# Patient Record
Sex: MALE | Race: WHITE
[De-identification: names, ages, dates, MRNs, and addresses within clinical notes are randomized per-mention and may not be internally consistent; named-entity substitution may affect disease eponyms.]

---

## 2017-10-12 ENCOUNTER — HOSPITAL ENCOUNTER (OUTPATIENT)
Dept: HOSPITAL 62 - SC | Age: 72
Discharge: HOME | End: 2017-10-12
Attending: OPHTHALMOLOGY
Payer: MEDICARE

## 2017-10-12 DIAGNOSIS — N18.3: ICD-10-CM

## 2017-10-12 DIAGNOSIS — Z85.51: ICD-10-CM

## 2017-10-12 DIAGNOSIS — N40.0: ICD-10-CM

## 2017-10-12 DIAGNOSIS — I12.9: ICD-10-CM

## 2017-10-12 DIAGNOSIS — H25.13: Primary | ICD-10-CM

## 2017-10-12 DIAGNOSIS — Z79.899: ICD-10-CM

## 2017-10-12 DIAGNOSIS — Z87.891: ICD-10-CM

## 2017-10-12 PROCEDURE — 08RJ3JZ REPLACEMENT OF RIGHT LENS WITH SYNTHETIC SUBSTITUTE, PERCUTANEOUS APPROACH: ICD-10-PCS | Performed by: OPHTHALMOLOGY

## 2017-10-12 PROCEDURE — 66984 XCAPSL CTRC RMVL W/O ECP: CPT

## 2017-10-12 PROCEDURE — V2632 POST CHMBR INTRAOCULAR LENS: HCPCS

## 2017-10-12 RX ADMIN — TETRACAINE HYDROCHLORIDE PRN DROP: 5 SOLUTION OPHTHALMIC at 08:16

## 2017-10-12 RX ADMIN — BESIFLOXACIN PRN DROP: 6 SUSPENSION OPHTHALMIC at 09:04

## 2017-10-12 RX ADMIN — TETRACAINE HYDROCHLORIDE PRN DROP: 5 SOLUTION OPHTHALMIC at 08:43

## 2017-10-12 RX ADMIN — TROPICAMIDE PRN DROP: 10 SOLUTION/ DROPS OPHTHALMIC at 08:35

## 2017-10-12 RX ADMIN — BESIFLOXACIN PRN DROP: 6 SUSPENSION OPHTHALMIC at 00:00

## 2017-10-12 RX ADMIN — CYCLOPENTOLATE HYDROCHLORIDE AND PHENYLEPHRINE HYDROCHLORIDE PRN DROP: 2; 10 SOLUTION/ DROPS OPHTHALMIC at 08:35

## 2017-10-12 RX ADMIN — TROPICAMIDE PRN DROP: 10 SOLUTION/ DROPS OPHTHALMIC at 08:27

## 2017-10-12 RX ADMIN — TROPICAMIDE PRN DROP: 10 SOLUTION/ DROPS OPHTHALMIC at 08:16

## 2017-10-12 RX ADMIN — BESIFLOXACIN PRN DROP: 6 SUSPENSION OPHTHALMIC at 08:27

## 2017-10-12 RX ADMIN — CYCLOPENTOLATE HYDROCHLORIDE AND PHENYLEPHRINE HYDROCHLORIDE PRN DROP: 2; 10 SOLUTION/ DROPS OPHTHALMIC at 08:16

## 2017-10-12 RX ADMIN — CYCLOPENTOLATE HYDROCHLORIDE AND PHENYLEPHRINE HYDROCHLORIDE PRN DROP: 2; 10 SOLUTION/ DROPS OPHTHALMIC at 08:27

## 2017-10-12 RX ADMIN — BESIFLOXACIN PRN DROP: 6 SUSPENSION OPHTHALMIC at 08:17

## 2017-10-12 RX ADMIN — TETRACAINE HYDROCHLORIDE PRN DROP: 5 SOLUTION OPHTHALMIC at 08:40

## 2017-10-13 NOTE — [facility] DISCHARGE SUMMARY E
[facility] Discharge Summary



NAME: ZANE SOLIS

                                      MRN: [id number]

                                AGE: 72Y

ADMITTED: 10/12/2017           DISCHARGED: 10/12/2017



HISTORY:

This is a 72-year-old patient who underwent cataract extraction of the

right eye.



DIAGNOSIS:

Cataract of the right eye.



HOSPITAL COURSE:

The patient underwent surgery because she was having difficulty driving at

night secondary to glare of headlights.  He should be on a regular diet. 

No bending at his waist.  No heavy lifting.  He should use his Besivance,

Ilevro, and Durezol at 3 p.m. and 8 p.m. and sleep with a rigid shield, and

I will see him for one day postoperative tomorrow.





DICTATING PHYSICIAN: CASSY ASHLEY M.D.



[id number]              DT: 10/13/2017 0816

PHY#: [id number]         DD: 10/13/2017 0743

ID:   [id number]               JOB#: [id number]       ACCT: [id number]



cc:CASSY ASHLEY M.D.

>

## 2017-10-13 NOTE — [facility] OPERATIVE REPORT E
[facility] Operative Report



NAME: ZANE SOLIS

                                      MRN: [id number]

                             AGE: 72Y

DATE OF SURGERY: 10/12/2017                   ROOM:



PREOPERATIVE DIAGNOSIS:

CATARACT, RIGHT EYE.



POSTOPERATIVE DIAGNOSIS:

CATARACT, RIGHT EYE.



OPERATION:

Cataract extraction with intraocular lens implant of the right eye.



SURGEON:

CASSY ASHLEY M.D.



ANESTHESIA:

Topical.



PROCEDURE:

After obtaining appropriate consent, the patient's right eye was prepped

and draped in sterile fashion as well as the surgeon in a sterile manner

and cataract surgery was started.  First a paracentesis blade was used to

make a small side-port incision.  Viscoelastic was used to inflate the

anterior chamber.  Next a 2.4 mm incision was made with the paracentesis

blade.  A continuous capsulorrhexis incision was made using a cystotome and

Utrata forceps.  Following this hydrodissection was carried out to make the

lens fully loose and mobile and it was rotated 90 degrees.  Following this,

a divide-and-conquer technique was used to phacoemulsify the lens with a

CDE of 11.93. The remaining cortex was removed with irrigation/aspiration. 

Provisc was instilled into the capsular bag to inflate the bag.  A SN60WF,

18.5 diopter lens was placed.  The remaining viscoelastic material was

removed with irrigation/aspiration.  Following this, a 10-0 nylon suture

was used to close the incision and it was found to be watertight.  Vigamox

was instilled in the eye and a protective shield was placed over the eye. 

The patient returned to the postoperative recovery in stable condition.





DICTATING PHYSICIAN: CASSY ASHLEY M.D.



[id number]              DT: 10/13/2017 0813

PHY#: [id number]         DD: 10/13/2017 0743

ID:   [id number]               JOB#: [id number]       ACCT: [id number]



cc:CASSY ASHLEY M.D.

>

## 2017-10-26 ENCOUNTER — HOSPITAL ENCOUNTER (OUTPATIENT)
Dept: HOSPITAL 62 - SC | Age: 72
Discharge: HOME | End: 2017-10-26
Attending: OPHTHALMOLOGY
Payer: MEDICARE

## 2017-10-26 DIAGNOSIS — Z87.891: ICD-10-CM

## 2017-10-26 DIAGNOSIS — I12.9: ICD-10-CM

## 2017-10-26 DIAGNOSIS — N18.3: ICD-10-CM

## 2017-10-26 DIAGNOSIS — Z96.1: ICD-10-CM

## 2017-10-26 DIAGNOSIS — Z85.51: ICD-10-CM

## 2017-10-26 DIAGNOSIS — Z79.899: ICD-10-CM

## 2017-10-26 DIAGNOSIS — K21.9: ICD-10-CM

## 2017-10-26 DIAGNOSIS — H25.12: Primary | ICD-10-CM

## 2017-10-26 PROCEDURE — V2632 POST CHMBR INTRAOCULAR LENS: HCPCS

## 2017-10-26 PROCEDURE — 08RK3JZ REPLACEMENT OF LEFT LENS WITH SYNTHETIC SUBSTITUTE, PERCUTANEOUS APPROACH: ICD-10-PCS | Performed by: OPHTHALMOLOGY

## 2017-10-26 PROCEDURE — 66984 XCAPSL CTRC RMVL W/O ECP: CPT

## 2017-10-26 RX ADMIN — BESIFLOXACIN PRN DROP: 6 SUSPENSION OPHTHALMIC at 00:00

## 2017-10-26 RX ADMIN — BESIFLOXACIN PRN DROP: 6 SUSPENSION OPHTHALMIC at 08:23

## 2017-10-26 RX ADMIN — CYCLOPENTOLATE HYDROCHLORIDE AND PHENYLEPHRINE HYDROCHLORIDE PRN DROP: 2; 10 SOLUTION/ DROPS OPHTHALMIC at 07:13

## 2017-10-26 RX ADMIN — TETRACAINE HYDROCHLORIDE PRN DROP: 5 SOLUTION OPHTHALMIC at 07:56

## 2017-10-26 RX ADMIN — BESIFLOXACIN PRN DROP: 6 SUSPENSION OPHTHALMIC at 07:21

## 2017-10-26 RX ADMIN — TROPICAMIDE PRN DROP: 10 SOLUTION/ DROPS OPHTHALMIC at 07:21

## 2017-10-26 RX ADMIN — CYCLOPENTOLATE HYDROCHLORIDE AND PHENYLEPHRINE HYDROCHLORIDE PRN DROP: 2; 10 SOLUTION/ DROPS OPHTHALMIC at 07:21

## 2017-10-26 RX ADMIN — TROPICAMIDE PRN DROP: 10 SOLUTION/ DROPS OPHTHALMIC at 07:13

## 2017-10-26 RX ADMIN — CYCLOPENTOLATE HYDROCHLORIDE AND PHENYLEPHRINE HYDROCHLORIDE PRN DROP: 2; 10 SOLUTION/ DROPS OPHTHALMIC at 07:33

## 2017-10-26 RX ADMIN — TROPICAMIDE PRN DROP: 10 SOLUTION/ DROPS OPHTHALMIC at 07:33

## 2017-10-26 RX ADMIN — TETRACAINE HYDROCHLORIDE PRN DROP: 5 SOLUTION OPHTHALMIC at 07:13

## 2017-10-26 RX ADMIN — BESIFLOXACIN PRN DROP: 6 SUSPENSION OPHTHALMIC at 07:13

## 2017-10-26 RX ADMIN — TETRACAINE HYDROCHLORIDE PRN DROP: 5 SOLUTION OPHTHALMIC at 07:59

## 2017-10-27 NOTE — [facility] DISCHARGE SUMMARY E
[facility] Discharge Summary



NAME: ZANE SOLIS

                                      MRN: [id number]

                                AGE: 72Y

ADMITTED: 10/26/2017           DISCHARGED: 10/26/2017





HISTORY OF PRESENT ILLNESS AND HOSPITAL COURSE:

This is a 72-year-old male who underwent cataract extraction of the left

eye.



DIAGNOSIS:

Cataract, left eye.



HOSPITAL COURSE:

He underwent surgery because he was having difficulty with driving because

of glare from headlights.



DISCHARGE INSTRUCTIONS:

1.  He should be on a regular diet.

2.  No bending at his waist and no heavy lifting.

3.  He should use his Besivance, Ilevro, and Durezol at 3 p.m. and 8 p.m.

and sleep with a rigid shield.

4.  I will see him for his one-day postoperative tomorrow.







DICTATING PHYSICIAN: CASSY ASHLEY M.D.



[age]M              DT: 10/27/2017 0827

PHY#: 2011         DD: 10/27/2017 0740

ID:   [id number]               JOB#: [id number]       ACCT: [id number]



cc:CASSY ASHLEY M.D.

>

## 2017-10-27 NOTE — [facility] OPERATIVE REPORT E
[facility] Operative Report



NAME: ZANE SOLIS

                                      MRN: [id number]

                             AGE: 72Y

DATE OF SURGERY: 10/27/2017                    ROOM:



PREOPERATIVE DIAGNOSIS:

Cataract, left eye.



POSTOPERATIVE DIAGNOSIS:

Cataract, left eye.



OPERATION:

Cataract extraction with intraocular lens implant of the left eye.



SURGEON:

CASSY ASHLEY M.D.



ANESTHESIA:

Topical.



PROCEDURE:

After obtaining appropriate consent, the patient's left eye was prepped and

draped in sterile fashion as well as the surgeon in a sterile manner and

cataract surgery was started.  First a paracentesis blade was used to make

a small side-port incision.  Viscoelastic was used to inflate the anterior

chamber.  Next a 2.4 mm incision was made with the paracentesis blade.  A

continuous capsulorrhexis incision was made using a cystotome and Utrata

forceps.  Following this hydrodissection was carried out to make the lens

fully loose and mobile and it was rotated 90 degrees.  Following this, a

divide-and-conquer technique was used to phacoemulsify the lens with a CDE

of 10.20. The remaining cortex was removed with irrigation/aspiration. 

Provisc was instilled into the capsular bag to inflate the bag.  A SN60WF,

21.0 diopter lens was placed.  The remaining viscoelastic material was

removed with irrigation/aspiration.  Following this, a 10-0 nylon suture

was used to close the incision and it was found to be watertight.  Vigamox

was instilled in the eye and a protective shield was placed over the eye. 

The patient returned to the postoperative recovery in stable condition.









DICTATING PHYSICIAN: CASSY ASHLEY M.D.



[age]M              DT: 10/27/2017 0825

PHY#: 2011         DD: 10/27/2017 0740

ID:   [id number]               JOB#: [id number]       ACCT: [id number]



cc:CASSY ASHLEY M.D.

>

## 2021-01-12 ENCOUNTER — HOSPITAL ENCOUNTER (EMERGENCY)
Dept: HOSPITAL 62 - ER | Age: 76
Discharge: HOME | End: 2021-01-12
Payer: MEDICARE

## 2021-01-12 VITALS — DIASTOLIC BLOOD PRESSURE: 86 MMHG | SYSTOLIC BLOOD PRESSURE: 130 MMHG

## 2021-01-12 DIAGNOSIS — N18.9: ICD-10-CM

## 2021-01-12 DIAGNOSIS — U07.1: Primary | ICD-10-CM

## 2021-01-12 DIAGNOSIS — I26.99: ICD-10-CM

## 2021-01-12 DIAGNOSIS — I12.9: ICD-10-CM

## 2021-01-12 DIAGNOSIS — R07.89: ICD-10-CM

## 2021-01-12 LAB
ADD MANUAL DIFF: NO
ALBUMIN SERPL-MCNC: 3.8 G/DL (ref 3.5–5)
ALP SERPL-CCNC: 100 U/L (ref 38–126)
ANION GAP SERPL CALC-SCNC: 8 MMOL/L (ref 5–19)
APTT BLD: 29.5 SEC (ref 23.5–35.8)
AST SERPL-CCNC: 18 U/L (ref 17–59)
BASOPHILS # BLD AUTO: 0 10^3/UL (ref 0–0.2)
BASOPHILS NFR BLD AUTO: 0.4 % (ref 0–2)
BILIRUB DIRECT SERPL-MCNC: 0.3 MG/DL (ref 0–0.4)
BILIRUB SERPL-MCNC: 0.9 MG/DL (ref 0.2–1.3)
BUN SERPL-MCNC: 19 MG/DL (ref 7–20)
CALCIUM: 8.9 MG/DL (ref 8.4–10.2)
CHLORIDE SERPL-SCNC: 106 MMOL/L (ref 98–107)
CK MB SERPL-MCNC: < 0.22 NG/ML (ref ?–4.55)
CK SERPL-CCNC: 56 U/L (ref 55–170)
CO2 SERPL-SCNC: 27 MMOL/L (ref 22–30)
EOSINOPHIL # BLD AUTO: 0.1 10^3/UL (ref 0–0.6)
EOSINOPHIL NFR BLD AUTO: 0.9 % (ref 0–6)
ERYTHROCYTE [DISTWIDTH] IN BLOOD BY AUTOMATED COUNT: 15.1 % (ref 11.5–14)
GLUCOSE SERPL-MCNC: 178 MG/DL (ref 75–110)
HCT VFR BLD CALC: 43.9 % (ref 37.9–51)
HGB BLD-MCNC: 14.5 G/DL (ref 13.5–17)
INR PPP: 0.99
LYMPHOCYTES # BLD AUTO: 1.4 10^3/UL (ref 0.5–4.7)
LYMPHOCYTES NFR BLD AUTO: 20.6 % (ref 13–45)
MCH RBC QN AUTO: 27.2 PG (ref 27–33.4)
MCHC RBC AUTO-ENTMCNC: 33.1 G/DL (ref 32–36)
MCV RBC AUTO: 82 FL (ref 80–97)
MONOCYTES # BLD AUTO: 0.8 10^3/UL (ref 0.1–1.4)
MONOCYTES NFR BLD AUTO: 12.3 % (ref 3–13)
NEUTROPHILS # BLD AUTO: 4.3 10^3/UL (ref 1.7–8.2)
NEUTS SEG NFR BLD AUTO: 65.8 % (ref 42–78)
PLATELET # BLD: 164 10^3/UL (ref 150–450)
POTASSIUM SERPL-SCNC: 3.7 MMOL/L (ref 3.6–5)
PROT SERPL-MCNC: 6.7 G/DL (ref 6.3–8.2)
PROTHROMBIN TIME: 13.3 SEC (ref 11.4–15.4)
RBC # BLD AUTO: 5.34 10^6/UL (ref 4.35–5.55)
TOTAL CELLS COUNTED % (AUTO): 100 %
TROPONIN I SERPL-MCNC: < 0.012 NG/ML
WBC # BLD AUTO: 6.6 10^3/UL (ref 4–10.5)

## 2021-01-12 PROCEDURE — 85379 FIBRIN DEGRADATION QUANT: CPT

## 2021-01-12 PROCEDURE — 85025 COMPLETE CBC W/AUTO DIFF WBC: CPT

## 2021-01-12 PROCEDURE — 36415 COLL VENOUS BLD VENIPUNCTURE: CPT

## 2021-01-12 PROCEDURE — 96372 THER/PROPH/DIAG INJ SC/IM: CPT

## 2021-01-12 PROCEDURE — 82553 CREATINE MB FRACTION: CPT

## 2021-01-12 PROCEDURE — 80053 COMPREHEN METABOLIC PANEL: CPT

## 2021-01-12 PROCEDURE — 85610 PROTHROMBIN TIME: CPT

## 2021-01-12 PROCEDURE — 96374 THER/PROPH/DIAG INJ IV PUSH: CPT

## 2021-01-12 PROCEDURE — 99285 EMERGENCY DEPT VISIT HI MDM: CPT

## 2021-01-12 PROCEDURE — 93005 ELECTROCARDIOGRAM TRACING: CPT

## 2021-01-12 PROCEDURE — 82550 ASSAY OF CK (CPK): CPT

## 2021-01-12 PROCEDURE — A9540 TC99M MAA: HCPCS

## 2021-01-12 PROCEDURE — 78580 LUNG PERFUSION IMAGING: CPT

## 2021-01-12 PROCEDURE — 84484 ASSAY OF TROPONIN QUANT: CPT

## 2021-01-12 PROCEDURE — 93010 ELECTROCARDIOGRAM REPORT: CPT

## 2021-01-12 PROCEDURE — 71045 X-RAY EXAM CHEST 1 VIEW: CPT

## 2021-01-12 PROCEDURE — 85730 THROMBOPLASTIN TIME PARTIAL: CPT

## 2021-01-12 NOTE — RADIOLOGY REPORT (SQ)
EXAM DESCRIPTION:  NM LUNG PERFUSION SCAN



IMAGES COMPLETED DATE/TIME:  1/12/2021 8:55 am



REASON FOR STUDY:  pleuritic left CP positive d-dimer



COMPARISON:   1/12/2021



RADIONUCLIDE AND DOSE:  5.26 millicuries TC-99m MAA

The route of agent administration: Intravenous



TECHNIQUE:  Eight views of the lungs acquired following injection of MAA.



LIMITATIONS:  None.



FINDINGS:  PERFUSION: Perfusion imaging reveals a large wedge-shaped perfusion defect involving the r
ight lower lobe.

OTHER: No other significant finding.



IMPRESSION:  High probability for pulmonary embolus of the right lower lobe.



TECHNICAL DOCUMENTATION:  JOB ID:  [id number]

 2011 [employer]- All Rights Reserved



Reading location - IP/workstation name: [id number]

## 2021-01-12 NOTE — ER DOCUMENT REPORT
ED General





<VANCE SHAW  - Last Filed: 01/12/21 15:53>





- General


TRAVEL OUTSIDE OF THE [place] IN LAST 30 DAYS: No





<ORACIO BAKER - Last Filed: 01/14/21 07:20>





- General


Chief Complaint: Chest Wall Pain


Stated Complaint: CHEST PAIN X5DAYS


Primary Care Provider: 


ANTHONY MEDINA MD [Primary Care Provider] - Follow up as needed


Notes: 


I spoke with this patient at 0 900 after returning from VQ scan where Janki 

technician and Dr. Estrada radiologist read this as a lower lobe pulmonary 

embolism.  Patient is tachypneic at 29 and has severe pain.  He reports 3 weeks 

ago he Covid and was tested negative per EMS in route.  He has no prior history 

of DVT or pulmonary embolism.  I spoke to Dr. Benoit hospitalist about this case 

shortly after this.  I spoke about this at 0 915 and he advises Lovenox oral 

Eliquis pain medication and antibiotics. (VANCE SHAW JR)





75-year-old male with history of hypertension and psoriasis on Humira, CKD 

presents with approximately 5 days of chest pain lateral anterior lower left 

chest exacerbated by taking deep breaths.  Patient has had dry cough for the 

past 3 weeks, wife has Covid currently with patient presumptive positive, over 

the past few days developed chest pain and has had scant red hemoptysis.  

Patient denies exertional chest pain, cardiac history, lower extremity edema, 

prior DVT/PE/hypercoagulability history, recent 

travel/trauma/immobilization/surgery, abdominal pain, back pain, fever, 

shortness of breath (ORACIO BAKER)





- Related Data


Allergies/Adverse Reactions: 


                                        





No Known Allergies Allergy (Verified 10/12/17 08:10)


   











Past Medical History





- Social History


Smoking Status: Never Smoker


Cigarette use (# per day): No


Chew tobacco use (# tins/day): No


Smoking Education Provided: No


Frequency of alcohol use: None


Lives with: Family


Family History: Reviewed & Not Pertinent


Patient has suicidal ideation: No


Patient has homicidal ideation: No





<ADELAIDEVANCE GUERIN  - Last Filed: 01/12/21 15:53>





- General


Information source: Patient





- Social History


Smoking Status: Never Smoker


Chew tobacco use (# tins/day): No


Frequency of alcohol use: None


Drug Abuse: None


Family History: Reviewed & Not Pertinent


Patient has homicidal ideation: No





- Past Medical History


Cardiac Medical History: Reports: Hx Hypertension - MEDICATION,NEW MEDS


   Denies: Hx Heart Attack


Pulmonary Medical History: 


   Denies: Hx Asthma


Neurological Medical History: Denies: Hx Cerebrovascular Accident, Hx Seizures


GI Medical History: Denies: Hx Hepatitis, Hx Hiatal Hernia, Hx Ulcer


Infectious Medical History: Denies: Hx Hepatitis


Past Surgical History: Reports: Hx Genitourinary Surgery - bladder.  Denies: Hx 

Open Heart Surgery, Hx Pacemaker





- Immunizations


Hx Diphtheria, Pertussis, Tetanus Vaccination: Yes





<ORACIO BAKER - Last Filed: 01/14/21 07:20>





Review of Systems





- Review of Systems


Constitutional: Recent illness - covid uri


EENT: No symptoms reported


Cardiovascular: No symptoms reported


Respiratory: See HPI, Cough, Hurts to breathe, Short of breath.  denies: Sputum,

Stridor, Wheezing


Gastrointestinal: No symptoms reported


Genitourinary: No symptoms reported


Male Genitourinary: No symptoms reported


Musculoskeletal: No symptoms reported


Skin: No symptoms reported


Hematologic/Lymphatic: No symptoms reported


Neurological/Psychological: No symptoms reported


-: Yes All other systems reviewed and negative





<VANCE SHAW JR - Last Filed: 01/12/21 15:53>





<ORACIO BAKER - Last Filed: 01/14/21 07:20>





- Review of Systems


Notes: 





REVIEW OF SYSTEMS:


CONSTITUTIONAL :  Denies fever,  chills, or sweats. 


EENT: + recent cold symptoms, denies throat pain


CARDIOVASCULAR:  + chest pain, -ARLEEN


RESPIRATORY:  + cough, denies shortness of breath.


GASTROINTESTINAL:  Denies abdominal pain, nausea/vomiting.


GENITOURINARY:  Denies difficulty urinating, painful urination.


MUSCULOSKELETAL:  Denies neck pain, back pain.


SKIN:   Denies rash or skin lesions.


HEMATOLOGIC :   Denies easy bruising or bleeding.


LYMPHATIC:  Denies swollen, enlarged glands.


NEUROLOGICAL:  Denies headache, denies change in gait.


PSYCHIATRIC:  Denies anxiety or stress or depression. (ORACIO BAKER)





Physical Exam





- Vital signs


Interpretation: Tachypneic





- General


General appearance: Appears well, Alert





- HEENT


Head: Normocephalic, Atraumatic


Eyes: Normal


Pupils: PERRL





- Respiratory


Respiratory status: No respiratory distress


Chest status: Tender - Left lower chest tenderness on palpation and inspiration.


Breath sounds: Normal


Chest palpation: Normal





- Cardiovascular


Rhythm: Regular


Heart sounds: Normal auscultation


Murmur: No





- Abdominal


Inspection: Normal


Distension: No distension


Bowel sounds: Normal


Tenderness: Nontender


Organomegaly: No organomegaly





- Rectal


Prostate: Other - Deferred





- Genitourinary


Scrotum: Other - Deferred





- Back


Back: Normal, Nontender





- Extremities


General upper extremity: Normal inspection, Nontender, Normal color, Normal ROM,

Normal temperature


General lower extremity: Normal inspection, Nontender, Normal color, Normal ROM,

Normal temperature, Normal weight bearing.  No: Zuleyka's sign





- Neurological


Neuro grossly intact: Yes


Cognition: Normal


Orientation: AAOx4


Anai Coma Scale Eye Opening: Spontaneous


Anai Coma Scale Verbal: Oriented


Anai Coma Scale Motor: Obeys Commands


[place] Coma Scale Total: 15


Speech: Normal


Motor strength normal: LUE, RUE, LLE, RLE


Sensory: Normal





- Psychological


Associated symptoms: Normal affect, Normal mood





- Skin


Skin Temperature: Warm


Skin Moisture: Dry


Skin Color: Normal





<ADELAIDEVANCE JR - Last Filed: 01/12/21 15:53>





<ORACIO BAKER - Last Filed: 01/14/21 07:20>





- Vital signs


Vitals: 


                                        











Resp Pulse Ox


 


 24 H  93 


 


 01/12/21 01:02  01/12/21 01:02














- Notes


Notes: 





PHYSICAL EXAMINATION:


 


GENERAL: Well-appearing, well-nourished and in no acute distress.


 


HEAD: Atraumatic, normocephalic.


 


EYES: Pupils equal round and appropriate constriction, sclera anicteric, 

conjunctiva are normal.


 


ENT: nares patent, moist mucous membranes.


 


NECK: Normal range of motion, supple without lymphadenopathy


 


LUNGS: Breath sounds clear to auscultation bilaterally and equal.  No wheezes 

rales or rhonchi.  Mild tachypnea, no accessory muscle use, speaking in full 

sentences, managing secretions, no supplemental O2


 


HEART/CHEST: Regular rate and rhythm without murmurs, no chest tenderness


 


ABDOMEN: Soft, nontender, no guarding, no masses, no CVAT


 


EXTREMITIES: Normal range of motion, no pitting or edema.  No cyanosis.


 


NEUROLOGICAL: Awake, alert, conversing appropriately, moves all extremities 

spontaneously.


 


PSYCH: Normal mood, normal affect.


 


SKIN: Warm, Dry, normal turgor, no rashes or lesions noted. (ORACIO BAKER)





Course





- Laboratory Results


Result Diagrams: 


                                 01/12/21 01:04





                                 01/12/21 01:04


Critical Laboratory Results Reviewed: Yes


Attending or Supervising Physician who Reviewed Labs: VANCE SHAW JR





- Radiology Results


Critical Radiology Results Reviewed: Yes


Attending or Supervising Physician who Reviewed Radiology: VANCE SHAW JR





- EKG Interpretation by Me


EKG shows normal: Sinus rhythm


Rate: Normal


Rhythm: NSR





<VANCE SHAW JR - Last Filed: 01/12/21 15:53>





- Laboratory Results


Result Diagrams: 


                                 01/12/21 01:04





                                 01/12/21 01:04





<ORACIO BAKER - Last Filed: 01/14/21 07:20>





- Re-evaluation


Re-evalutation: 





01/12/21 07:40


Patient with presumptive Covid positive presents with 5 days pleuritic chest 

pain, rule out superimposed bacterial pneumonia, rule out PE, less likely ACS.  

EKG, troponin, D-dimer, chest x-ray.  Patient's D-dimer was positive so ordered 

empiric dose of Lovenox and VQ scan as patient's creatinine too high to perform 

CTA chest.  Patient's exam has been stable in ED so far, have turned patient 

over to Dr. Shaw pending VQ scan.  Possible infiltrate on chest x-ray but 

this may be secondary to PE given no fever and no leukocytosis, will give 

antibiotics if no PE on V/Q. (ORACIO BAKER)





- Vital Signs


Vital signs: 


                                        











Temp Pulse Resp BP Pulse Ox


 


       12   130/86 H  97 


 


       01/12/21 11:01  01/12/21 11:01  01/12/21 11:01














- Laboratory Results


Laboratory Results Interpreted: 


                                        











  01/12/21 01/12/21 01/12/21





  01:04 01:04 01:04


 


RDW  15.1 H  


 


D-Dimer    3.28 H


 


Creatinine   1.62 H 


 


Est GFR ( Amer)   51 L 


 


Est GFR (MDRD) Non-Af   42 L 


 


Glucose   178 H 














- Radiology Results


Radiology Results Interpreted: 





01/12/21 15:54


PE RLL per radiology dr lima


01/12/21 15:55


 (VANCE SHAW JR)





- EKG Interpretation by Me


Additional EKG results interpreted by me: 





01/12/21 07:42


Sinus tachycardia, no significant ST elevations or depressions, no significant T

wave abnormalities (ORACIO BAKER)





Discharge





<ADELAIDEVANCE APOORVA FLAHERTY - Last Filed: 01/12/21 15:53>





<ORACIO BAKER - Last Filed: 01/14/21 07:20>





- Discharge


Clinical Impression: 


 Pulmonary embolism and infarction, Pulmonary embolism associated with COVID-19,

Chest pain at rest





Condition: Stable


Disposition: HOME, SELF-CARE


Additional Instructions: 


Follow-up with personal doctor this week return to ER as needed take medicines 

as directed encourage fluids; you have a pulmonary embolism that was probably 

initiated by Covid.  Take antibiotics as well.  If more shortness of breath and 

chest pain recur despite pain medicines will have to return to ER.


Prescriptions: 


Hydrocodone Bit/Homatropine [Hycodan Syrup 5-1.5 mg/5 ml Ud Cup] 5 ml PO Q4HP 

PRN #120 ml


 PRN Reason: 


Apixaban [Eliquis 5 mg Tablet] 5 mg PO BID 30 Days #60 tablet


Azithromycin [Zithromax 250 mg Tablet] 250 mg PO ASDIR PRN #6 tablet


 PRN Reason: 


Referrals: 


ANTHONY MEDINA MD [Primary Care Provider] - Follow up as needed

## 2021-01-12 NOTE — EKG REPORT
SEVERITY:- ABNORMAL ECG -

SINUS TACHYCARDIA

BORDERLINE T WAVE ABNORMALITIES

:

Confirmed by: Margoth Jj MD 12-Jan-2021 09:50:51

## 2021-01-12 NOTE — RADIOLOGY REPORT (SQ)
CHEST X-RAY 1 VIEW on 1/12/2021 at 1:33 AM 



CLINICAL INDICATION: Cough



COMPARISON: 10/6/2014



FINDINGS: There is mild developing patchy left lower lung opacity

consistent with atelectasis or early pneumonia, differential

diagnosis would include viral infections. There is minimal left

apical scarring. The lungs are otherwise clear. Cardiac, hilar

and mediastinal contours are within normal limits. Pulmonary

vascularity is within normal limits.



IMPRESSION: Mild left lower lung atelectasis or early pneumonia,

differential diagnosis would include viral infections.